# Patient Record
Sex: FEMALE | Race: WHITE | NOT HISPANIC OR LATINO | URBAN - METROPOLITAN AREA
[De-identification: names, ages, dates, MRNs, and addresses within clinical notes are randomized per-mention and may not be internally consistent; named-entity substitution may affect disease eponyms.]

---

## 2018-10-19 ENCOUNTER — OFFICE VISIT - HEALTHEAST (OUTPATIENT)
Dept: CARDIOLOGY | Facility: CLINIC | Age: 33
End: 2018-10-19

## 2018-10-19 DIAGNOSIS — Q21.0 VSD (VENTRICULAR SEPTAL DEFECT): ICD-10-CM

## 2018-10-19 RX ORDER — LEVOTHYROXINE SODIUM 125 UG/1
1 TABLET ORAL DAILY
Status: SHIPPED | COMMUNITY
Start: 2018-02-07

## 2018-10-19 ASSESSMENT — MIFFLIN-ST. JEOR: SCORE: 1487.4

## 2018-11-02 ENCOUNTER — HOSPITAL ENCOUNTER (OUTPATIENT)
Dept: CARDIOLOGY | Facility: CLINIC | Age: 33
Discharge: HOME | End: 2018-11-02
Attending: INTERNAL MEDICINE

## 2018-11-02 DIAGNOSIS — Q21.0 VSD (VENTRICULAR SEPTAL DEFECT): ICD-10-CM

## 2018-11-02 ASSESSMENT — MIFFLIN-ST. JEOR: SCORE: 1487.4

## 2018-11-05 LAB
AORTIC ROOT: 2.8 CM
BSA FOR ECHO PROCEDURE: 1.91 M2
CV BLOOD PRESSURE: NORMAL MMHG
CV ECHO HEIGHT: 64 IN
CV ECHO WEIGHT: 178 LBS
DOP CALC LVOT AREA: 3.14 CM2
DOP CALC LVOT DIAMETER: 2 CM
DOP CALC LVOT PEAK VEL: 116 CM/S
DOP CALC LVOT STROKE VOLUME: 89.5 CM3
DOP CALCLVOT PEAK VEL VTI: 28.5 CM
EJECTION FRACTION: 63 % (ref 55–75)
FRACTIONAL SHORTENING: 36.3 % (ref 28–44)
INTERVENTRICULAR SEPTUM IN END DIASTOLE: 0.96 CM (ref 0.6–0.9)
IVS/PW RATIO: 1
LA AREA 1: 9.21 CM2
LA AREA 2: 15.3 CM2
LEFT ATRIUM LENGTH: 4.21 CM
LEFT ATRIUM SIZE: 3.1 CM
LEFT ATRIUM VOLUME INDEX: 14.9 ML/M2
LEFT ATRIUM VOLUME: 28.5 ML
LEFT VENTRICLE CARDIAC INDEX: 3.1 L/MIN/M2
LEFT VENTRICLE CARDIAC OUTPUT: 6 L/MIN
LEFT VENTRICLE DIASTOLIC VOLUME INDEX: 62.3 CM3/M2 (ref 34–74)
LEFT VENTRICLE DIASTOLIC VOLUME: 119 CM3 (ref 46–106)
LEFT VENTRICLE HEART RATE: 67 BPM
LEFT VENTRICLE MASS INDEX: 47.9 G/M2
LEFT VENTRICLE SYSTOLIC VOLUME INDEX: 23 CM3/M2 (ref 11–31)
LEFT VENTRICLE SYSTOLIC VOLUME: 44 CM3 (ref 14–42)
LEFT VENTRICULAR INTERNAL DIMENSION IN DIASTOLE: 3.36 CM (ref 3.8–5.2)
LEFT VENTRICULAR INTERNAL DIMENSION IN SYSTOLE: 2.14 CM (ref 2.2–3.5)
LEFT VENTRICULAR MASS: 91.4 G
LEFT VENTRICULAR OUTFLOW TRACT MEAN GRADIENT: 3 MMHG
LEFT VENTRICULAR OUTFLOW TRACT MEAN VELOCITY: 75.5 CM/S
LEFT VENTRICULAR OUTFLOW TRACT PEAK GRADIENT: 5 MMHG
LEFT VENTRICULAR POSTERIOR WALL IN END DIASTOLE: 0.95 CM (ref 0.6–0.9)
LV STROKE VOLUME INDEX: 46.9 ML/M2
MITRAL VALVE E/A RATIO: 2.8
MV DECELERATION TIME: 225 MS
MV PEAK A VELOCITY: 48.1 CM/S
MV PEAK E VELOCITY: 136 CM/S
NUC REST DIASTOLIC VOLUME INDEX: 2848 LBS
NUC REST SYSTOLIC VOLUME INDEX: 64 IN
PR MAX PG: 3 MMHG
PR PEAK VELOCITY: 92.9 CM/S
TRICUSPID REGURGITATION PEAK PRESSURE GRADIENT: 23 MMHG
TRICUSPID VALVE ANULAR PLANE SYSTOLIC EXCURSION: 1.9 CM
TRICUSPID VALVE PEAK REGURGITANT VELOCITY: 240 CM/S

## 2021-05-31 ENCOUNTER — RECORDS - HEALTHEAST (OUTPATIENT)
Dept: ADMINISTRATIVE | Facility: CLINIC | Age: 36
End: 2021-05-31

## 2021-06-02 VITALS — BODY MASS INDEX: 30.39 KG/M2 | WEIGHT: 178 LBS | HEIGHT: 64 IN

## 2021-06-02 VITALS — BODY MASS INDEX: 30.39 KG/M2 | HEIGHT: 64 IN | WEIGHT: 178 LBS

## 2021-06-21 NOTE — PROGRESS NOTES
[facility] Consultation Note    Thank you, Dr. Genesis Burks MD, for asking the [facility] team to see Myra Ro in consultation today at our clinic to evaluate etiology follow-up regarding previous VSD repair.      Assessment:   1.  VSD repair during infancy currently asymptomatic  2.  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  Would consider repeat echocardiogram in 5 years            Current History:   32-year-old female who underwent VSD repair at [facility] at 5 months of age.  Her last echocardiogram was in April 2013.  She was noted to have normal left ventricular systolic function with no residual VSD noted.  She had normal right ventricular size and systolic function with mild Ebstein's anomaly present with her tricuspid valve with mild to moderate tricuspid regurgitation.  Patient denies any change in exercise tolerance or dyspnea on exertion    Past Medical History:   No past medical history on file.  Hypothyroidism on thyroid replacement    Past Surgical History:   No past surgical history on file.  No other surgeries other than a VSD repair    Family History:   No family history on file.  No other family history of cardiac abnormalities other than her daughter also had VSD repair    Social History:    reports that she has never smoked. She has never used smokeless tobacco. She reports that she does not use illicit drugs.  Patient is  with 2 children    Meds:   Scheduled Meds:  PRN Meds:.    Allergies:   Amoxicillin    Review of Systems:   General: WNL  Eyes: WNL  Ears/Nose/Throat: Nosebleeds  Lungs: WNL  Heart: Shortness of Breath with activity, Irregular Heartbeat  Stomach: WNL  Bladder: WNL  Muscle/Joints: Muscle Weakness, Muscle Pain  Skin: Poor Wound Healing  Nervous System: WNL  Mental Health: Anxiety     Blood: Easy Bruising      Objective:      Physical  "Exam  178 lb (80.7 kg)  5' 4\" (1.626 m)  Body mass index is 30.55 kg/(m^2).  /70 (Patient Site: Right Arm, Patient Position: Sitting, Cuff Size: Adult Regular)  Pulse 68  Resp 16  Ht 5' 4\" (1.626 m)  Wt 178 lb (80.7 kg)  BMI 30.55 kg/m2    General Appearance:   alert, no apparent distress   HEENT:  no scleral icterus; the mucous membranes were pink and moist                                  Neck: jugular venous pressure is normal, no thyromegaly   Chest: the spine was straight and the chest was symmetric   Lungs:   respirations unlabored; the lungs are clear to auscultation   Cardiovascular:   regular rhythm with normal first and second heart sounds and no murmurs, clicks, or gallops. Carotid, radial, femoral, and posterior tibial pulses are intact; there are no carotid or femoral bruits.   Abdomen:  no organomegaly, masses, bruits, or tenderness; bowel sounds are present   Extremities: no cyanosis, clubbing, or edema.  No obvious musculoskeletal abnormalities   Skin: no xanthelasma   Neurologic: mood and affect are appropriate         echocardiogram report from April 19, 2013 results were reviewed as above          Lab Review   No results found for: NA, K, CL, CO2, BUN, CREATININE, GLUCOSE, CALCIUM  No results found for: WBC, HGB, HCT, MCV, PLT  No results found for: CHOL, TRIG, HDL, LDLDIRECT      Solitario Ryan M.D., F.A.C.C.  [facility]  [phone]         "